# Patient Record
Sex: MALE | Race: WHITE | NOT HISPANIC OR LATINO | Employment: FULL TIME | URBAN - METROPOLITAN AREA
[De-identification: names, ages, dates, MRNs, and addresses within clinical notes are randomized per-mention and may not be internally consistent; named-entity substitution may affect disease eponyms.]

---

## 2022-11-14 ENCOUNTER — LAB VISIT (OUTPATIENT)
Dept: LAB | Facility: OTHER | Age: 37
End: 2022-11-14
Attending: INTERNAL MEDICINE
Payer: MEDICAID

## 2022-11-14 DIAGNOSIS — R14.0 BLOATING: ICD-10-CM

## 2022-11-14 DIAGNOSIS — K12.1 MOUTH ULCER: Primary | ICD-10-CM

## 2022-11-14 DIAGNOSIS — H57.89 EYE DISCHARGE: ICD-10-CM

## 2022-11-14 DIAGNOSIS — R19.4 CHANGE IN BOWEL HABITS: ICD-10-CM

## 2022-11-14 PROCEDURE — 86364 TISS TRNSGLTMNASE EA IG CLAS: CPT | Mod: 59 | Performed by: INTERNAL MEDICINE

## 2022-11-14 PROCEDURE — 36415 COLL VENOUS BLD VENIPUNCTURE: CPT | Performed by: INTERNAL MEDICINE

## 2022-11-17 LAB
GLIADIN PEPTIDE IGA SER-ACNC: 6 UNITS
GLIADIN PEPTIDE IGG SER-ACNC: 4 UNITS
IGA SERPL-MCNC: 199 MG/DL (ref 70–400)
TTG IGA SER-ACNC: 6 UNITS
TTG IGG SER-ACNC: 4 UNITS

## 2023-08-31 ENCOUNTER — HOSPITAL ENCOUNTER (OUTPATIENT)
Dept: CARDIOLOGY | Facility: HOSPITAL | Age: 38
Discharge: HOME | End: 2023-08-31
Attending: NURSE PRACTITIONER
Payer: MEDICAID

## 2023-08-31 DIAGNOSIS — M79.604 ACUTE PAIN OF RIGHT LOWER EXTREMITY: ICD-10-CM

## 2023-08-31 DIAGNOSIS — I73.9 CLAUDICATION OF RIGHT LOWER EXTREMITY: ICD-10-CM

## 2023-08-31 DIAGNOSIS — M79.89 SWELLING OF RIGHT LOWER EXTREMITY: ICD-10-CM

## 2023-08-31 PROCEDURE — 93971 CV US DOPPLER VENOUS LEG RIGHT (CUPID ONLY): ICD-10-PCS | Mod: 26,RT,, | Performed by: INTERNAL MEDICINE

## 2023-08-31 PROCEDURE — 93971 EXTREMITY STUDY: CPT | Mod: 26,RT,, | Performed by: INTERNAL MEDICINE

## 2023-08-31 PROCEDURE — 93971 EXTREMITY STUDY: CPT | Mod: RT

## 2023-11-10 ENCOUNTER — TELEPHONE (OUTPATIENT)
Dept: OTOLARYNGOLOGY | Facility: CLINIC | Age: 38
End: 2023-11-10
Payer: MEDICAID

## 2024-01-14 ENCOUNTER — HOSPITAL ENCOUNTER (EMERGENCY)
Facility: HOSPITAL | Age: 39
Discharge: HOME | End: 2024-01-14
Attending: EMERGENCY MEDICINE
Payer: MEDICAID

## 2024-01-14 VITALS
WEIGHT: 170 LBS | BODY MASS INDEX: 23.03 KG/M2 | HEART RATE: 68 BPM | OXYGEN SATURATION: 100 % | TEMPERATURE: 98 F | SYSTOLIC BLOOD PRESSURE: 144 MMHG | DIASTOLIC BLOOD PRESSURE: 88 MMHG | RESPIRATION RATE: 18 BRPM | HEIGHT: 72 IN

## 2024-01-14 DIAGNOSIS — M25.561 ACUTE PAIN OF RIGHT KNEE: Primary | ICD-10-CM

## 2024-01-14 PROCEDURE — 99283 EMERGENCY DEPT VISIT LOW MDM: CPT

## 2024-01-14 PROCEDURE — 25000003 PHARM REV CODE 250

## 2024-01-14 PROCEDURE — 29505 APPLICATION LONG LEG SPLINT: CPT | Mod: RT

## 2024-01-14 RX ORDER — ACETAMINOPHEN 500 MG
1000 TABLET ORAL
Status: COMPLETED | OUTPATIENT
Start: 2024-01-14 | End: 2024-01-14

## 2024-01-14 RX ORDER — DARUNAVIR, COBICISTAT, EMTRICITABINE, AND TENOFOVIR ALAFENAMIDE 800; 150; 200; 10 MG/1; MG/1; MG/1; MG/1
TABLET, FILM COATED ORAL
COMMUNITY
Start: 2021-01-30

## 2024-01-14 RX ADMIN — ACETAMINOPHEN 1000 MG: 500 TABLET ORAL at 12:01

## 2024-01-14 NOTE — ED NOTES
Patient identifiers verified and correct for Mihir Kaleb  LOC: The patient is awake, alert and aware of environment with an appropriate affect, the patient is oriented x 3 and speaking appropriately.   APPEARANCE: Patient appears comfortable and in no acute distress, patient is clean and well groomed.  SKIN: The skin is warm and dry, color consistent with ethnicity, patient has normal skin turgor and moist mucus membranes, skin intact, no breakdown or bruising noted.   MUSCULOSKELETAL: Patient moving all extremities spontaneously, no swelling noted except to right knee, pt arrives in brace, reports some tingling to lower limb  RESPIRATORY: Airway is open and patent, respirations are spontaneous, patient has a normal effort and rate, no accessory muscle use noted, O2 sats noted at 97% on room air.  CARDIAC:  Patient has a normal rate and regular rhythm, no edema noted, capillary refill < 3 seconds.   GASTRO: Soft and non tender to palpation, no distention noted, normoactive bowel sounds present in all four quadrants. Pt states bowel movements have been regular.  : Pt denies any pain or frequency with urination.  NEURO: Pt opens eyes spontaneously, behavior appropriate to situation, follows commands, facial expression symmetrical, bilateral hand grasp equal and even, purposeful motor response noted, normal sensation in all extremities when touched with a finger.

## 2024-01-14 NOTE — ED TRIAGE NOTES
Pt reports falling and twisting right knee while snow skiing on Friday heard pop; pt braced leg, iced, and kept elevated till arriving today

## 2024-01-14 NOTE — ED PROVIDER NOTES
Encounter Date: 1/14/2024       History     Chief Complaint   Patient presents with    Knee Injury     R knee injury skiing on friday     38-year-old male with no relevant past medical history presents with knee pain after a skiing accident 48 hours ago.  He said that he was skiing and he fell and his leg got twisted.  He says that his knee felt unstable afterwards and he was not been able to bear weight without pain.  He has been taking ibuprofen around the clock for pain.      The history is provided by the patient. No  was used.     Review of patient's allergies indicates:  No Known Allergies  History reviewed. No pertinent past medical history.  History reviewed. No pertinent surgical history.  History reviewed. No pertinent family history.     Review of Systems    Physical Exam     Initial Vitals [01/14/24 1106]   BP Pulse Resp Temp SpO2   (!) 144/88 68 18 98.2 °F (36.8 °C) 100 %      MAP       --         Physical Exam    Nursing note and vitals reviewed.  Constitutional: He appears well-developed and well-nourished. He is not diaphoretic. No distress.   HENT:   Head: Normocephalic and atraumatic.   Eyes: EOM are normal. Pupils are equal, round, and reactive to light. Right eye exhibits no discharge. Left eye exhibits no discharge.   Neck: Neck supple.   Cardiovascular:  Normal rate, regular rhythm, normal heart sounds and intact distal pulses.           Pulmonary/Chest: Breath sounds normal. He has no wheezes. He has no rhonchi. He has no rales.   Abdominal: Abdomen is soft. There is no abdominal tenderness. There is no rebound and no guarding.   Musculoskeletal:         General: Edema present. No tenderness. Normal range of motion.      Cervical back: Neck supple.      Comments: Knee effusion on the right side, neurovascularly intact distally, no breaks in the skin, no bony deformity     Neurological: He is alert and oriented to person, place, and time. He has normal strength. GCS score is  15. GCS eye subscore is 4. GCS verbal subscore is 5. GCS motor subscore is 6.   Skin: Skin is warm and dry. Capillary refill takes less than 2 seconds. No rash noted. No erythema. No pallor.   Psychiatric: He has a normal mood and affect. His behavior is normal. Judgment and thought content normal.         ED Course   Procedures  Labs Reviewed - No data to display       Imaging Results              X-Ray Knee 1 or 2 View Right (Final result)  Result time 01/14/24 14:12:56      Final result by Monico Brown MD (01/14/24 14:12:56)                   Impression:      As above.      Electronically signed by: Monico Brown MD  Date:    01/14/2024  Time:    14:12               Narrative:    EXAMINATION:  XR KNEE 1 OR 2 VIEW RIGHT    CLINICAL HISTORY:  knee pain;    TECHNIQUE:  AP and lateral views of the right knee were performed.    FINDINGS:  There is no fracture, dislocation, or bony erosion.                                       Medications   acetaminophen tablet 1,000 mg (1,000 mg Oral Given 1/14/24 1226)     Medical Decision Making  Amount and/or Complexity of Data Reviewed  Radiology: ordered. Decision-making details documented in ED Course.    Risk  OTC drugs.               ED Course as of 01/14/24 1701   Sun Jan 14, 2024   1417 X-Ray Knee 1 or 2 View Right  No fracture seen per my independent interpretation.     [DC]   1700 38-year-old male in no acute distress.  Differential includes ligamentous injury versus acute fracture versus dislocation, doubt vascular injury, patient is 48 hours from the injury and has intact distal pulses, there is no ecchymosis [BP]   1701 Patient was discharged with knee immobilizer, weight-bearing as tolerated, follow up with Orthopedics.  I answered all questions, provided discharge paperwork and the patient was discharged in stable condition [BP]      ED Course User Index  [BP] Terry Gonzalez MD  [DC] Yang Thomason MD                           Clinical Impression:  Final  diagnoses:  [M25.561] Acute pain of right knee (Primary)          ED Disposition Condition    Discharge Stable          ED Prescriptions    None       Follow-up Information       Follow up With Specialties Details Why Contact Info Additional Information    [facility] - Emergency Dept Emergency Medicine  As needed, If symptoms worsen [address]  [facility] [address] [phone]     [facility] - Orthopedics 5th Fl Orthopedics   [address] [facility], [address]th [address]  [facility] [address] [phone] [facility] - Main Building, 5th Floor Please park in [facility] and take Atrium elevator             Terry Gonzalez MD  Resident  01/14/24 2614

## 2024-01-14 NOTE — DISCHARGE INSTRUCTIONS
Diagnosis: Knee pain    Tests today showed:   Labs Reviewed - No data to display  Imaging Results              X-Ray Knee 1 or 2 View Right (Final result)  Result time 01/14/24 14:12:56      Final result by Monico Brown MD (01/14/24 14:12:56)                   Impression:      As above.      Electronically signed by: Monico Brown MD  Date:    01/14/2024  Time:    14:12               Narrative:    EXAMINATION:  XR KNEE 1 OR 2 VIEW RIGHT    CLINICAL HISTORY:  knee pain;    TECHNIQUE:  AP and lateral views of the right knee were performed.    FINDINGS:  There is no fracture, dislocation, or bony erosion.                                      Treatments you had today:   Medications   acetaminophen tablet 1,000 mg (1,000 mg Oral Given 1/14/24 1226)       Home Care Instructions:  - Continue ibuprofen for pain  - Rest Ice Compression Elevation    Follow-Up Plan:  - Follow-up with: Primary care doctor within 3 - 5 days  - Additional testing and/or evaluation as directed by your primary doctor    Return to the Emergency Department for symptoms including: worsening symptoms, worsening headache or a new headache which is severe, headache with vision changes, headache with neck stiffness or fever, numbness or tingling, weakness, problems with coordination, speech changes, vomiting with inability to hold down fluids, severe headache different from previous headaches, dizziness, passing out/fainting/unconsciousness, or other concerning symptoms.